# Patient Record
Sex: FEMALE | Race: WHITE
[De-identification: names, ages, dates, MRNs, and addresses within clinical notes are randomized per-mention and may not be internally consistent; named-entity substitution may affect disease eponyms.]

---

## 2017-01-17 NOTE — DIAGNOSTIC IMAGING REPORT
NUCLEAR MEDICINE HEPATOBILIARY SCAN WITH EJECTION FRACTION ANALYSIS



CLINICAL HISTORY: Abdominal pain and vomiting    



COMPARISON STUDY:  Biliary ultrasound dated 11/24/2015



FINDINGS: Patient was injected with 5.2 mCi of technetium 99m Choletec.

Sequential anterior imaging was performed. The gallbladder was first visualized

on the 15 minute image. There was normal passage of activity into small bowel.

At 1 hour, the patient was administered 1.7 mcg of sincalide utilizing a 30

minute intravenous infusion. The gallbladder ejection fraction was normal

measuring 54%.



IMPRESSION:  

1. No evidence of cystic duct obstruction. Normal gallbladder ejection fraction

of 54% 









Electronically signed by:  Walter Franco M.D.

1/17/2017 3:28 PM



Dictated Date/Time:  1/17/2017 3:26 PM
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
## 2017-06-08 NOTE — CODING QUERY MEDICAL NECESSITY
SUPPORTING DIAGNOSIS NEEDED



Dr. Nix,



A supporting diagnosis is required for the test/procedure performed on this patient in 
order for us to be reimbursed by the patient's insurance. Please provide a supporting 
diagnosis for the following test/procedure listed below next to the test name along with 
your signature. 



*If there is no additional diagnosis for this patient that would support the following 
test/procedure please document that below next to the test/procedure.



Test(s)/Procedure(s) that require a supporting diagnosis:





* VITAMIN D ASSAY            DIAGNOSIS:





***DATE OF SERVICE: 6/2/17***





Provider Signature:  ______________________________  Date:  _______



Thank you  

Tashi Lopes

[facility] Information Management

Phone:  [phone]

Fax:  [phone]



Once completed, please kindly fax back to [phone]



For questions please call [phone]

## 2017-08-16 NOTE — EEG PROCEDURE NOTE
EEG Procedure Note


Date of Service


Aug 16, 2017.





Start / End Times


Start Time:  9:24 AM


End Time:  10:24 AM





Referring Physician


Pretty Boucher





History


This is a 32-year-old female with seizure-like activity. Extended EEG for 

further evaluation.





Pertinent home medications include Topamax and Ativan





Home Medication List


Scheduled


Clonazepam (Klonopin), 2 MG PO BID


Paroxetine (Paroxetine HCl), 40 MG PO DAILY


Propranolol HCl (Propranolol HCl ER), 60 MG PO DAILY


Topiramate (Topamax), 50 MG PO BID





Scheduled PRN


Gabapentin (Gabapentin), 600 MG PO QID PRN for Sciatica Pain





Description


This is a 21 electrode EEG with a single channel dedicated to limited EKG. The 

electrodes were placed in accordance with the International 10-20 system.





Photic stimulation and hyperventilation was not done because the patient became 

very anxious.





At the start of the recording the patient was in an awake state. Background was 

well organized and composed of symmetric mixed alpha and beta frequencies. 

There was a symmetric well-formed moderate amplitude 10-11 Hz posterior 

dominant rhythm that was reactive to eye opening and closure.  Drowsiness was 

indicated by vertex waves, loss of muscle artifact and slowing of the 

background rhythm. There was no stage II sleep transients.





Interpretation


This is a normal one hour extended EEG. 





There was no electrographic seizures or epileptiform discharges.





Clinical Correlation


A normal EEG does not rule out epilepsy if there is a strong clinical suspicion.

## 2017-08-16 NOTE — DIAGNOSTIC IMAGING REPORT
MRI OF THE BRAIN WITHOUT IV CONTRAST



CLINICAL HISTORY: Migraine headache. Question of seizure-like activity. Loss of

balance.



COMPARISON STUDY: CT of the brain dated 12/16/2016.



TECHNIQUE: MRI of the brain was performed utilizing various T1 and T2-weighted

sequences in the axial, sagittal, and coronal planes. IV contrast was not

administered for this examination. Examination is performed using the seizure

protocol.



FINDINGS:



Brain parenchyma: The brain parenchyma is normal in appearance. There is no

hemorrhage or mass effect. There is no restricted diffusion to suggest acute

ischemia. Gray-white matter differentiation is preserved. No extra-axial fluid

collection is seen. The cerebellar tonsils are normal in configuration. The

hippocampi are normal and symmetric.



Ventricles, sulci, and cisterns: Normal in configuration.



Pituitary and sella: Unremarkable.



Intracranial vasculature: Normal flow voids are maintained at the skull base.



Orbits: The bony orbits are grossly intact. Orbital contents are normal in

appearance.



Sinuses and mastoids: Mild mucosal thickening is seen within the maxillary antra

and ethmoid sinuses. The remaining paranasal sinuses are clear. The mastoid air

cells are well pneumatized.



Calvarium: Unremarkable.



Cervical cord: Partially visualized cervical spinal cord is normal in morphology

and signal intensity.





IMPRESSION: No acute intracranial abnormality.







Electronically signed by:  Graham Amaya M.D.

8/16/2017 9:10 AM



Dictated Date/Time:  8/16/2017 9:06 AM

## 2018-02-27 ENCOUNTER — HOSPITAL ENCOUNTER (OUTPATIENT)
Dept: HOSPITAL 45 - C.ULTR | Age: 33
Discharge: HOME | End: 2018-02-27
Attending: NURSE PRACTITIONER
Payer: COMMERCIAL

## 2018-02-27 DIAGNOSIS — R10.11: Primary | ICD-10-CM

## 2018-02-27 NOTE — DIAGNOSTIC IMAGING REPORT
ABDOMEN LIMITED (US)



HISTORY:  33 years-old Female RUQ PAIN acute right upper quadrant abdominal pain



COMPARISON: CT abdomen and pelvis 12/9/2016, hepatobiliary scan 1/17/2017



TECHNIQUE: Multiple real-time sonographic images of the abdominal right upper

quadrant were obtained assessing grayscale appearance and color flow.



FINDINGS: 

The pancreas is obscured by bowel gas. The liver is within normal limits

measuring 15.0 cm in length. No focal hepatic mass lesions or intrahepatic

biliary ductal dilation identified. The gallbladder appears normal without

cholelithiasis, wall thickening or pericholecystic fluid. Common bile duct is

normal, 4 mm.



The imaged right kidney is unremarkable without hydronephrosis, however

partially obscured by bowel gas.



IMPRESSION: 

1. Unremarkable exam of the abdominal right upper quadrant. No cholelithiasis or

sonographic evidence of acute cholecystitis.

2. No biliary ductal dilation. 







The above report was generated using voice recognition software. It may contain

grammatical, syntax or spelling errors.







Electronically signed by:  Irving Ramírez M.D.

2/27/2018 10:01 AM



Dictated Date/Time:  2/27/2018 9:59 AM

## 2018-05-22 ENCOUNTER — HOSPITAL ENCOUNTER (EMERGENCY)
Dept: HOSPITAL 45 - C.EDB | Age: 33
Discharge: HOME | End: 2018-05-22
Payer: COMMERCIAL

## 2018-05-22 VITALS — TEMPERATURE: 98.06 F

## 2018-05-22 VITALS
WEIGHT: 194.01 LBS | HEIGHT: 67.01 IN | BODY MASS INDEX: 30.45 KG/M2 | HEIGHT: 67.01 IN | WEIGHT: 194.01 LBS | BODY MASS INDEX: 30.45 KG/M2

## 2018-05-22 VITALS — HEART RATE: 62 BPM | DIASTOLIC BLOOD PRESSURE: 78 MMHG | OXYGEN SATURATION: 98 % | SYSTOLIC BLOOD PRESSURE: 119 MMHG

## 2018-05-22 DIAGNOSIS — Z90.89: ICD-10-CM

## 2018-05-22 DIAGNOSIS — Z86.69: ICD-10-CM

## 2018-05-22 DIAGNOSIS — R19.7: ICD-10-CM

## 2018-05-22 DIAGNOSIS — Z82.49: ICD-10-CM

## 2018-05-22 DIAGNOSIS — D64.9: ICD-10-CM

## 2018-05-22 DIAGNOSIS — Z98.818: ICD-10-CM

## 2018-05-22 DIAGNOSIS — M54.2: Primary | ICD-10-CM

## 2018-05-22 DIAGNOSIS — Z79.899: ICD-10-CM

## 2018-05-22 LAB
ALBUMIN SERPL-MCNC: 3.5 GM/DL (ref 3.4–5)
ALP SERPL-CCNC: 63 U/L (ref 45–117)
ALT SERPL-CCNC: 15 U/L (ref 12–78)
AST SERPL-CCNC: 31 U/L (ref 15–37)
BASOPHILS # BLD: 0.03 K/UL (ref 0–0.2)
BASOPHILS NFR BLD: 0.5 %
BUN SERPL-MCNC: 10 MG/DL (ref 7–18)
CALCIUM SERPL-MCNC: 8.6 MG/DL (ref 8.5–10.1)
CO2 SERPL-SCNC: 25 MMOL/L (ref 21–32)
CREAT SERPL-MCNC: 1.02 MG/DL (ref 0.6–1.2)
EOS ABS #: 0.12 K/UL (ref 0–0.5)
EOSINOPHIL NFR BLD AUTO: 173 K/UL (ref 130–400)
GLUCOSE SERPL-MCNC: 96 MG/DL (ref 70–99)
HCT VFR BLD CALC: 34.9 % (ref 37–47)
HGB BLD-MCNC: 11.6 G/DL (ref 12–16)
IG#: 0 K/UL (ref 0–0.02)
IMM GRANULOCYTES NFR BLD AUTO: 37.8 %
LYMPHOCYTES # BLD: 2.35 K/UL (ref 1.2–3.4)
MCH RBC QN AUTO: 30.1 PG (ref 25–34)
MCHC RBC AUTO-ENTMCNC: 33.2 G/DL (ref 32–36)
MCV RBC AUTO: 90.6 FL (ref 80–100)
MONO ABS #: 0.29 K/UL (ref 0.11–0.59)
MONOCYTES NFR BLD: 4.7 %
NEUT ABS #: 3.42 K/UL (ref 1.4–6.5)
NEUTROPHILS # BLD AUTO: 1.9 %
NEUTROPHILS NFR BLD AUTO: 55.1 %
PMV BLD AUTO: 10.4 FL (ref 7.4–10.4)
POTASSIUM SERPL-SCNC: 3.6 MMOL/L (ref 3.5–5.1)
PROT SERPL-MCNC: 6.4 GM/DL (ref 6.4–8.2)
RED CELL DISTRIBUTION WIDTH CV: 13.3 % (ref 11.5–14.5)
RED CELL DISTRIBUTION WIDTH SD: 44.1 FL (ref 36.4–46.3)
SODIUM SERPL-SCNC: 141 MMOL/L (ref 136–145)
WBC # BLD AUTO: 6.21 K/UL (ref 4.8–10.8)

## 2018-05-22 NOTE — EMERGENCY ROOM VISIT NOTE
History


First contact with patient:  18:38


Chief Complaint:  NECK PAIN


Stated Complaint:  NECK SHOULDER PAIN 2 WK VIRUS, REF BY NARAYAN TOMLINSON





History of Present Illness


The patient is a 33 year old female who presents to the Emergency Room via 

private vehicle with complaints of "neck/shoulder pain, two-week virus, 

referred by chiropractor".  She states that she has been experiencing right 

sided neck pain extending into her right shoulder region for many weeks.  She 

states then she developed what she believed was a stomach virus 2 weeks ago.  

She notes that she has profuse diarrhea and at times notes that she becomes 

nearly incontinent because of the amount of diarrhea.  She states that she went 

to see her chiropractor a few days ago who did a massage and manipulation she 

felt excellent for 2 days.  Then the pain returned and is now severe.  She 

rates the pain as a 4-6/10.  She denies any current fever, chest pain, 

shortness of breath.  No abdominal pain other than minimal discomfort in the 

upper quadrant regions.





Review of Systems


A complete 10-point Review of Systems was discussed with the patient, with 

pertinent positives and negatives listed in the History of Present Illness. All 

remaining Review of Systems questions can be considered negative unless 

otherwise specified.





Past Medical/Surgical History


Medical Problems:


(1) Back pain


(2) Bronchitis


(3) Migraine


(4) opiate dependence


(5) Seizures


Surgical Problems:


(1) Hx of tonsillectomy


(2) [place] teeth removed








Family History





Cancer


Hypertension


Lung disease





Social History


Smoking Status:  Never Smoker


Alcohol Use:  none


Drug Use:  none


Marital Status:  in relationship


Housing Status:  lives with family


Occupation Status:  employed





Current/Historical Medications


Scheduled


Paroxetine (Paroxetine HCl), 40 MG PO DAILY


Promethazine HCl (Promethazine HCl), 25 MG PO QID


Propranolol HCl (Propranolol HCl ER), 60 MG PO DAILY


Topiramate (Topiramate), 100 MG PO BID





Scheduled PRN


Carisoprodol (Soma), 350 MG PO TID PRN for Muscle Spasms


Gabapentin (Gabapentin), 600 MG PO QID PRN for Sciatica Pain


Lorazepam (Lorazepam), 2 TAB PO TID PRN for Anxiety


Methocarbamol (Methocarbamol), 500 MG PO QID PRN for Muscle Spasms





Physical Exam


Vital Signs











  Date Time  Temp Pulse Resp B/P (MAP) Pulse Ox O2 Delivery O2 Flow Rate FiO2


 


5/22/18 20:45  60 16 109/73 93 Room Air  


 


5/22/18 18:26 36.7 67 20 106/72 99 Room Air  











Physical Exam


VITAL SIGNS - Vital signs and nursing notes were reviewed.  Stable.


GENERAL -33-year-old female appearing her stated age who is in no acute 

distress. Communicates well with provider and answers questions appropriately.


SKIN - Without rashes.  No meningeal or petechial rash.


HEAD - NC/AT.


EYES - PERRL with EOMI bilaterally. Sclera anicteric. 


EARS - No deformities of external structures noted on gross examination 

bilaterally. 


NOSE - Midline and without cyanosis. No epistaxis or purulent drainage noted. 


MOUTH/OROPHARYNX - Without perioral cyanosis.


NECK - Neck with FROM. Supple to palpation.  No lymphadenopathy noted. No 

nuchal rigidity.  Reproducible tenderness of the right-sided paraspinous 

musculature extending into the right thoracic region.  Travels the region of 

the right trapezius muscle.


LUNGS - Chest wall symmetric without accessory muscle use, intercostals 

retractions, or central cyanosis. Normal vesicular breath sounds CTA B/L. No 

wheezes, rales, or rhonchi appreciated.


CARDIAC - RRR with S1/S2. No murmur, rubs, or gallops appreciated. 


ABDOMEN - Abdominal contour normal without pulsations or visible masses. BS 

normoactive all four quadrants.  Minimal upper quadrant abdominal tenderness 

noted.  No  palpable masses, hepatosplenomegaly, or ascites noted.


EXTREMITIES - No clubbing or peripheral cyanosis. No pretibial edema present. +5

/5 strength noted in UE/LE bilaterally.


NEUROLOGIC - Cranial nerves II through XII grossly intact. Sensory intact to 

light touch throughout.


PSYCH - A&O, and cooperates fully with examiner. Pt is very pleasant and 

interacts well with examiner.





Medical Decision & Procedures


Laboratory Results


5/22/18 19:25








Red Blood Count 3.85, Mean Corpuscular Volume 90.6, Mean Corpuscular Hemoglobin 

30.1, Mean Corpuscular Hemoglobin Concent 33.2, Mean Platelet Volume 10.4, 

Neutrophils (%) (Auto) 55.1, Lymphocytes (%) (Auto) 37.8, Monocytes (%) (Auto) 

4.7, Eosinophils (%) (Auto) 1.9, Basophils (%) (Auto) 0.5, Neutrophils # (Auto) 

3.42, Lymphocytes # (Auto) 2.35, Monocytes # (Auto) 0.29, Eosinophils # (Auto) 

0.12, Basophils # (Auto) 0.03





5/22/18 19:25

















Test


  5/22/18


19:25 5/22/18


21:25


 


White Blood Count


  6.21 K/uL


(4.8-10.8) 


 


 


Red Blood Count


  3.85 M/uL


(4.2-5.4) 


 


 


Hemoglobin


  11.6 g/dL


(12.0-16.0) 


 


 


Hematocrit 34.9 % (37-47)  


 


Mean Corpuscular Volume


  90.6 fL


() 


 


 


Mean Corpuscular Hemoglobin


  30.1 pg


(25-34) 


 


 


Mean Corpuscular Hemoglobin


Concent 33.2 g/dl


(32-36) 


 


 


Platelet Count


  173 K/uL


(130-400) 


 


 


Mean Platelet Volume


  10.4 fL


(7.4-10.4) 


 


 


Neutrophils (%) (Auto) 55.1 %  


 


Lymphocytes (%) (Auto) 37.8 %  


 


Monocytes (%) (Auto) 4.7 %  


 


Eosinophils (%) (Auto) 1.9 %  


 


Basophils (%) (Auto) 0.5 %  


 


Neutrophils # (Auto)


  3.42 K/uL


(1.4-6.5) 


 


 


Lymphocytes # (Auto)


  2.35 K/uL


(1.2-3.4) 


 


 


Monocytes # (Auto)


  0.29 K/uL


(0.11-0.59) 


 


 


Eosinophils # (Auto)


  0.12 K/uL


(0-0.5) 


 


 


Basophils # (Auto)


  0.03 K/uL


(0-0.2) 


 


 


RDW Standard Deviation


  44.1 fL


(36.4-46.3) 


 


 


RDW Coefficient of Variation


  13.3 %


(11.5-14.5) 


 


 


Immature Granulocyte % (Auto) 0.0 %  


 


Immature Granulocyte # (Auto)


  0.00 K/uL


(0.00-0.02) 


 


 


Anion Gap


  6.0 mmol/L


(3-11) 


 


 


Est Creatinine Clear Calc


Drug Dose 88.5 ml/min 


  


 


 


Estimated GFR (


American) 83.1 


  


 


 


Estimated GFR (Non-


American 71.7 


  


 


 


BUN/Creatinine Ratio 9.6 (10-20)  


 


Calcium Level


  8.6 mg/dl


(8.5-10.1) 


 


 


Magnesium Level


  1.9 mg/dl


(1.8-2.4) 


 


 


Total Bilirubin


  0.2 mg/dl


(0.2-1) 


 


 


Aspartate Amino Transf


(AST/SGOT) 31 U/L (15-37) 


  


 


 


Alanine Aminotransferase


(ALT/SGPT) 15 U/L (12-78) 


  


 


 


Alkaline Phosphatase


  63 U/L


() 


 


 


Total Protein


  6.4 gm/dl


(6.4-8.2) 


 


 


Albumin


  3.5 gm/dl


(3.4-5.0) 


 


 


Globulin


  2.9 gm/dl


(2.5-4.0) 


 


 


Albumin/Globulin Ratio 1.2 (0.9-2)  


 


Thyroid Stimulating Hormone


(TSH) 1.050 uIu/ml


(0.300-4.500) 


 


 


Urine Color  YELLOW 


 


Urine Appearance  CLEAR (CLEAR) 


 


Urine pH  7.5 (4.5-7.5) 


 


Urine Specific Gravity


  


  1.011


(1.000-1.030)


 


Urine Protein  NEG (NEG) 


 


Urine Glucose (UA)  NEG (NEG) 


 


Urine Ketones  NEG (NEG) 


 


Urine Occult Blood  NEG (NEG) 


 


Urine Nitrite  NEG (NEG) 


 


Urine Bilirubin  NEG (NEG) 


 


Urine Urobilinogen  NEG (NEG) 


 


Urine Leukocyte Esterase  NEG (NEG) 


 


Urine Pregnancy Test  NEG (NEG) 











Medications Administered











 Medications


  (Trade)  Dose


 Ordered  Sig/Lorin


 Route  Start Time


 Stop Time Status Last Admin


Dose Admin


 


 Sodium Chloride  1,000 ml @ 


 999 mls/hr  Q1H1M STAT


 IV  5/22/18 19:01


 5/22/18 20:01 DC 5/22/18 19:33


999 MLS/HR


 


 Carisoprodol


  (Soma Tab)  350 mg  NOW  STAT


 PO  5/22/18 19:01


 5/22/18 19:03 DC 5/22/18 19:16


350 MG











Medical Decision


Patient was seen and evaluated as above in room D5. Review was performed of 

nursing notes and vital signs. After obtaining a thorough history and physical 

examination the above work up was performed.  She presents to us today with 

right-sided reproducible paraspinous musculature tenderness of the thoracic and 

cervical spine in the outline of the trapezius muscle.  It is reproducible on 

exam with palpation and movement of the right upper extremity.  When I asked 

her what brings her in the emergency department she began to talk about her 

diarrhea, and how there is also accompanying abdominal pain, neck pain, head 

pain, and was asked to come here by her chiropractor.  On exam she has nothing 

to suggest an acute process.  She has no signs of meningitis or encephalitis.  

Full range of motion of the C-spine noted.  She is afebrile.  Vital signs are 

stable.  I did elect to obtain baseline labs and there is no concerning 

leukocytosis, no new concerning anemia, or evidence of metabolic abnormality.  

Urine is negative.  Urine pretty test is negative.  She was given 1 Soma muscle 

relaxer without relief.  I did reassess her and she was sleeping, therefore I 

do not believe that any other medications were warranted.  When I woke her up 

we also talked about her other medications could cause her to be tired if she 

notes that she has been very tired over the past 2 weeks.  I asked her about 

her Ativan prescription which was just filled on the 17th, she notes that she 

is not taking it anymore.  I informed her that I do not want to prescribe a 

muscle relaxer on top of the Ativan if she is still taking it.  She reassured 

me she is not taking that or the Robaxin.  I felt to be reasonable to do a 

short course of Soma noting she said she had success with this in the past for 

muscle spasm.  I did discuss with her whether or not to perform a lumbar 

puncture given that the person who referred her here was concerned regarding 

her virus and neck pain but using joint decision making with the patient 

decision was made to refrain at this time.  The risk was felt to outweigh the 

benefit.  She was fully coherent and able to answer all questions and I did 

have a lengthy discussion with her at the end of the visit regarding management 

of the muscle strain.  She is to call her family doctor to schedule follow-up 

or return with worsening.  The patient was educated upon management, had 

questions answered prior to discharge, and was discharged home in good 

condition.





It is important to note that although she was referred here by the chiropractor

, that her neck pain did not occur after the manipulation therefore I do not 

suspect dissection or other intimal injury.  I suspect this to be purely 

musculoskeletal.





I attest that I have personally reviewed the patient medication list.





I attest that I have reviewed the patient's blood pressure and it was found to 

be stable.





In the evaluation and treatment of this patient, the following differential 

diagnoses were considered: Musculoskeletal Strain, Discitis, Cervical Spine 

Fracture, Cervical Spine Dislocation, Cervical Spine Subluxation, Cervical 

Spondylosis, Fibromyalgia, Osteoarthritis, Polymyalgia Rheumatica, Psychogenic 

Pain Disorder, Tumor of Soft Tissue or Spine.





Impression





 Primary Impression:  


 Neck pain


 Additional Impression:  


 Anemia





Departure Information


Dispostion


Home / Self-Care





Condition


GOOD





Prescriptions





Carisoprodol (Soma) 350 Mg Tab


350 MG PO TID Y for Muscle Spasms for 3 Days, #9 TAB


   Prov: Colby Sandoval PA-C         5/22/18





Referrals


Austin Nix M.D. (PCP)





Patient Instructions


My [facility]





Additional Instructions





You have been treated in the Emergency Department for upper right back Pain. 





I suspect you are likely experiencing a muscle spasm.





You have been prescribed Soma muscle relaxer 1 tablet every 8 hours as needed 

for severe muscle spasm.  Please be careful taking his medication as it can 

further cause tiredness/drowsiness.  As we discussed please be careful to not 

take this with the other medications.





For pain control, you can use the following over-the-counter medicines (if >11 yo):





- Regular strength (325mg/tab) Tylenol (acetaminophen) 2 tabs every 4-6 hours 

as needed. Do not exceed 12 tablets in a 24 hour period. Avoid taking more than 

3 grams (3000 mg) of Tylenol per day. This includes any other sources of 

acetaminophen you may take on a regular basis.





- Regular strength (200 mg/tab) Advil (ibuprofen) 1-2 tabs every 4-6 hours as 

needed. Do not exceed a dose of 3200 mg per day.





If this is an acute injury, ice can be applied to the area of pain for the 

first 3 days to help decrease pain and inflammation. After the first 3 days, a 

heating pad can be used over the area for continued soothing relief.





You should schedule a follow-up appointment with your family doctor regarding 

your right shoulder pain and diarrhea.





Return to the Emergency Department if your current symptoms worsen despite 

treatment course outlined above, or if you develop any of the following symptoms

: intractable pain despite aforementioned treatment course, loss of control of 

your bowel or bladder, numbness or tingling in your groin, or development of a 

fever.





Problem Qualifiers